# Patient Record
Sex: MALE | Race: WHITE | URBAN - METROPOLITAN AREA
[De-identification: names, ages, dates, MRNs, and addresses within clinical notes are randomized per-mention and may not be internally consistent; named-entity substitution may affect disease eponyms.]

---

## 2017-04-28 ENCOUNTER — OFFICE VISIT (OUTPATIENT)
Dept: FAMILY MEDICINE CLINIC | Age: 37
End: 2017-04-28

## 2017-04-28 VITALS
BODY MASS INDEX: 29.53 KG/M2 | SYSTOLIC BLOOD PRESSURE: 120 MMHG | DIASTOLIC BLOOD PRESSURE: 80 MMHG | HEIGHT: 72 IN | WEIGHT: 218 LBS | HEART RATE: 72 BPM

## 2017-04-28 DIAGNOSIS — S60.10XA SUBUNGUAL HEMATOMA OF DIGIT OF HAND, INITIAL ENCOUNTER: Primary | ICD-10-CM

## 2017-04-28 PROCEDURE — 99213 OFFICE O/P EST LOW 20 MIN: CPT | Performed by: FAMILY MEDICINE

## 2017-04-28 PROCEDURE — 90715 TDAP VACCINE 7 YRS/> IM: CPT | Performed by: FAMILY MEDICINE

## 2017-04-28 PROCEDURE — 90471 IMMUNIZATION ADMIN: CPT | Performed by: FAMILY MEDICINE

## 2017-04-28 RX ORDER — AZITHROMYCIN 250 MG/1
TABLET, FILM COATED ORAL
Qty: 6 TABLET | Refills: 0 | Status: SHIPPED | OUTPATIENT
Start: 2017-04-28 | End: 2019-12-12 | Stop reason: ALTCHOICE

## 2019-12-12 ENCOUNTER — OFFICE VISIT (OUTPATIENT)
Dept: FAMILY MEDICINE CLINIC | Age: 39
End: 2019-12-12
Payer: COMMERCIAL

## 2019-12-12 ENCOUNTER — HOSPITAL ENCOUNTER (OUTPATIENT)
Age: 39
Discharge: HOME | End: 2019-12-12
Payer: COMMERCIAL

## 2019-12-12 VITALS
SYSTOLIC BLOOD PRESSURE: 116 MMHG | HEART RATE: 79 BPM | OXYGEN SATURATION: 98 % | HEIGHT: 72 IN | WEIGHT: 224.2 LBS | BODY MASS INDEX: 30.37 KG/M2 | DIASTOLIC BLOOD PRESSURE: 68 MMHG

## 2019-12-12 DIAGNOSIS — M79.675 GREAT TOE PAIN, LEFT: ICD-10-CM

## 2019-12-12 DIAGNOSIS — M79.675 GREAT TOE PAIN, LEFT: Primary | ICD-10-CM

## 2019-12-12 DIAGNOSIS — K52.9 CHRONIC DIARRHEA: ICD-10-CM

## 2019-12-12 LAB
A/G RATIO: 1.6 (ref 1.1–2.2)
ALBUMIN SERPL-MCNC: 4.4 G/DL (ref 3.4–5)
ALP BLD-CCNC: 56 U/L (ref 40–129)
ALT SERPL-CCNC: 40 U/L (ref 10–40)
AMYLASE: 63 U/L (ref 25–115)
ANION GAP SERPL CALCULATED.3IONS-SCNC: 13 MMOL/L (ref 3–16)
AST SERPL-CCNC: 24 U/L (ref 15–37)
BASOPHILS ABSOLUTE: 0 K/UL (ref 0–0.2)
BASOPHILS RELATIVE PERCENT: 0.5 %
BILIRUB SERPL-MCNC: 0.4 MG/DL (ref 0–1)
BUN BLDV-MCNC: 15 MG/DL (ref 7–20)
CALCIUM SERPL-MCNC: 9.6 MG/DL (ref 8.3–10.6)
CHLORIDE BLD-SCNC: 102 MMOL/L (ref 99–110)
CO2: 26 MMOL/L (ref 21–32)
CREAT SERPL-MCNC: 1 MG/DL (ref 0.9–1.3)
EOSINOPHILS ABSOLUTE: 0.1 K/UL (ref 0–0.6)
EOSINOPHILS RELATIVE PERCENT: 2 %
GFR AFRICAN AMERICAN: >60
GFR NON-AFRICAN AMERICAN: >60
GLOBULIN: 2.8 G/DL
GLUCOSE BLD-MCNC: 102 MG/DL (ref 70–99)
HCT VFR BLD CALC: 38.7 % (ref 40.5–52.5)
HEMOGLOBIN: 13.1 G/DL (ref 13.5–17.5)
LIPASE: 35 U/L (ref 13–60)
LYMPHOCYTES ABSOLUTE: 2.1 K/UL (ref 1–5.1)
LYMPHOCYTES RELATIVE PERCENT: 31.4 %
MCH RBC QN AUTO: 27.1 PG (ref 26–34)
MCHC RBC AUTO-ENTMCNC: 33.9 G/DL (ref 31–36)
MCV RBC AUTO: 80.1 FL (ref 80–100)
MONOCYTES ABSOLUTE: 0.5 K/UL (ref 0–1.3)
MONOCYTES RELATIVE PERCENT: 8 %
NEUTROPHILS ABSOLUTE: 3.8 K/UL (ref 1.7–7.7)
NEUTROPHILS RELATIVE PERCENT: 58.1 %
PDW BLD-RTO: 14.2 % (ref 12.4–15.4)
PLATELET # BLD: 221 K/UL (ref 135–450)
PMV BLD AUTO: 9.6 FL (ref 5–10.5)
POTASSIUM SERPL-SCNC: 4.7 MMOL/L (ref 3.5–5.1)
RBC # BLD: 4.83 M/UL (ref 4.2–5.9)
SEDIMENTATION RATE, ERYTHROCYTE: 12 MM/HR (ref 0–15)
SODIUM BLD-SCNC: 141 MMOL/L (ref 136–145)
TOTAL PROTEIN: 7.2 G/DL (ref 6.4–8.2)
TSH REFLEX: 2.02 UIU/ML (ref 0.27–4.2)
URIC ACID, SERUM: 7.3 MG/DL (ref 3.5–7.2)
WBC # BLD: 6.5 K/UL (ref 4–11)

## 2019-12-12 PROCEDURE — 85652 RBC SED RATE AUTOMATED: CPT

## 2019-12-12 PROCEDURE — 84443 ASSAY THYROID STIM HORMONE: CPT

## 2019-12-12 PROCEDURE — 99213 OFFICE O/P EST LOW 20 MIN: CPT | Performed by: NURSE PRACTITIONER

## 2019-12-12 PROCEDURE — 84550 ASSAY OF BLOOD/URIC ACID: CPT

## 2019-12-12 PROCEDURE — 82150 ASSAY OF AMYLASE: CPT

## 2019-12-12 PROCEDURE — 80053 COMPREHEN METABOLIC PANEL: CPT

## 2019-12-12 PROCEDURE — 83690 ASSAY OF LIPASE: CPT

## 2019-12-12 PROCEDURE — 36415 COLL VENOUS BLD VENIPUNCTURE: CPT

## 2019-12-12 PROCEDURE — 85025 COMPLETE CBC W/AUTO DIFF WBC: CPT

## 2019-12-12 ASSESSMENT — ENCOUNTER SYMPTOMS
DIARRHEA: 1
BLOOD IN STOOL: 0
SHORTNESS OF BREATH: 0
ABDOMINAL DISTENTION: 0
COLOR CHANGE: 0
NAUSEA: 0
CONSTIPATION: 0
ABDOMINAL PAIN: 0
VOMITING: 0
RECTAL PAIN: 1

## 2019-12-12 ASSESSMENT — PATIENT HEALTH QUESTIONNAIRE - PHQ9
SUM OF ALL RESPONSES TO PHQ QUESTIONS 1-9: 0
SUM OF ALL RESPONSES TO PHQ QUESTIONS 1-9: 0
2. FEELING DOWN, DEPRESSED OR HOPELESS: 0
1. LITTLE INTEREST OR PLEASURE IN DOING THINGS: 0
SUM OF ALL RESPONSES TO PHQ9 QUESTIONS 1 & 2: 0

## 2021-02-19 ENCOUNTER — TELEPHONE (OUTPATIENT)
Dept: INTERNAL MEDICINE CLINIC | Age: 41
End: 2021-02-19

## 2021-02-19 NOTE — TELEPHONE ENCOUNTER
Patient called stating he was referred to Dr. Ilya Luong by Dr. Sal Gallardo, and wants to know if you would take him on as a new patient?      Please call to advise

## 2021-03-16 ENCOUNTER — OFFICE VISIT (OUTPATIENT)
Dept: INTERNAL MEDICINE CLINIC | Age: 41
End: 2021-03-16
Payer: COMMERCIAL

## 2021-03-16 VITALS
DIASTOLIC BLOOD PRESSURE: 74 MMHG | TEMPERATURE: 97.3 F | HEIGHT: 72 IN | WEIGHT: 231 LBS | HEART RATE: 74 BPM | SYSTOLIC BLOOD PRESSURE: 123 MMHG | OXYGEN SATURATION: 99 % | BODY MASS INDEX: 31.29 KG/M2

## 2021-03-16 DIAGNOSIS — K60.2 RECTAL FISSURE: ICD-10-CM

## 2021-03-16 DIAGNOSIS — M79.672 BILATERAL FOOT PAIN: ICD-10-CM

## 2021-03-16 DIAGNOSIS — K90.49 MALABSORPTION DUE TO INTOLERANCE, NOT ELSEWHERE CLASSIFIED: ICD-10-CM

## 2021-03-16 DIAGNOSIS — L57.0 KERATOSIS: ICD-10-CM

## 2021-03-16 DIAGNOSIS — M79.671 BILATERAL FOOT PAIN: ICD-10-CM

## 2021-03-16 DIAGNOSIS — K90.49 MALABSORPTION DUE TO INTOLERANCE, NOT ELSEWHERE CLASSIFIED: Primary | ICD-10-CM

## 2021-03-16 PROBLEM — K64.9 ACUTE HEMORRHOID: Status: ACTIVE | Noted: 2021-03-16

## 2021-03-16 LAB
A/G RATIO: 1.8 (ref 1.1–2.2)
ALBUMIN SERPL-MCNC: 4.8 G/DL (ref 3.4–5)
ALP BLD-CCNC: 56 U/L (ref 40–129)
ALT SERPL-CCNC: 28 U/L (ref 10–40)
ANION GAP SERPL CALCULATED.3IONS-SCNC: 11 MMOL/L (ref 3–16)
ANISOCYTOSIS: ABNORMAL
AST SERPL-CCNC: 18 U/L (ref 15–37)
BASOPHILS ABSOLUTE: 0 K/UL (ref 0–0.2)
BASOPHILS RELATIVE PERCENT: 0.8 %
BILIRUB SERPL-MCNC: 0.3 MG/DL (ref 0–1)
BUN BLDV-MCNC: 13 MG/DL (ref 7–20)
CALCIUM SERPL-MCNC: 9.5 MG/DL (ref 8.3–10.6)
CHLORIDE BLD-SCNC: 100 MMOL/L (ref 99–110)
CO2: 27 MMOL/L (ref 21–32)
CREAT SERPL-MCNC: 0.9 MG/DL (ref 0.9–1.3)
EOSINOPHILS ABSOLUTE: 0.1 K/UL (ref 0–0.6)
EOSINOPHILS RELATIVE PERCENT: 1.7 %
GFR AFRICAN AMERICAN: >60
GFR NON-AFRICAN AMERICAN: >60
GLOBULIN: 2.6 G/DL
GLUCOSE BLD-MCNC: 127 MG/DL (ref 70–99)
HCT VFR BLD CALC: 30.1 % (ref 40.5–52.5)
HEMATOLOGY PATH CONSULT: YES
HEMOGLOBIN: 9 G/DL (ref 13.5–17.5)
HYPOCHROMIA: ABNORMAL
IGA: 296 MG/DL (ref 70–400)
LYMPHOCYTES ABSOLUTE: 2 K/UL (ref 1–5.1)
LYMPHOCYTES RELATIVE PERCENT: 35 %
MCH RBC QN AUTO: 17.5 PG (ref 26–34)
MCHC RBC AUTO-ENTMCNC: 29.9 G/DL (ref 31–36)
MCV RBC AUTO: 58.4 FL (ref 80–100)
MICROCYTES: ABNORMAL
MONOCYTES ABSOLUTE: 0.5 K/UL (ref 0–1.3)
MONOCYTES RELATIVE PERCENT: 9.1 %
NEUTROPHILS ABSOLUTE: 3 K/UL (ref 1.7–7.7)
NEUTROPHILS RELATIVE PERCENT: 53.4 %
OVALOCYTES: ABNORMAL
PDW BLD-RTO: 18.4 % (ref 12.4–15.4)
PLATELET # BLD: 331 K/UL (ref 135–450)
PMV BLD AUTO: 9 FL (ref 5–10.5)
POTASSIUM SERPL-SCNC: 4.5 MMOL/L (ref 3.5–5.1)
RBC # BLD: 5.15 M/UL (ref 4.2–5.9)
SCHISTOCYTES: ABNORMAL
SODIUM BLD-SCNC: 138 MMOL/L (ref 136–145)
TARGET CELLS: ABNORMAL
TOTAL PROTEIN: 7.4 G/DL (ref 6.4–8.2)
WBC # BLD: 5.6 K/UL (ref 4–11)

## 2021-03-16 PROCEDURE — 99214 OFFICE O/P EST MOD 30 MIN: CPT | Performed by: INTERNAL MEDICINE

## 2021-03-16 RX ORDER — HYDROCORTISONE 25 MG/G
CREAM TOPICAL
Qty: 28 G | Refills: 2 | Status: SHIPPED | OUTPATIENT
Start: 2021-03-16

## 2021-03-16 ASSESSMENT — PATIENT HEALTH QUESTIONNAIRE - PHQ9: SUM OF ALL RESPONSES TO PHQ9 QUESTIONS 1 & 2: 0

## 2021-03-16 NOTE — PROGRESS NOTES
CHIEF COMPLAINT: Brianne Kidd is a 38 y.o. male who presents for : Malabsorption and  Rectal pain  HPI: Patient presented with malabsorption and that every time he eats he has to go to the bathroom this is been going on for a number of years he has had a colonoscopy was negative but never was worked worked up for malabsorption he is unsure what medicines or foods induce this his weight has been stable and there is no family history of any malabsorption including celiac disease in addition he complains of about a 6-month history of severe rectal pain is been diagnosed as hemorrhoids is tried various hemorrhoid medicine with little success    Review of Systems:   Constitutional:  Denies fever or chills   Eyes:  Denies change in visual acuity   HENT:  Denies nasal congestion or sore throat   Respiratory:  Denies cough or shortness of breath   Cardiovascular:  Denies chest pain or edema   GI:  Denies abdominal pain, nausea, vomiting, bloody stools or diarrhea   :  Denies dysuria   Musculoskeletal:  Denies back pain or joint pain   Integument:  Denies rash   Neurologic:  Denies headache, focal weakness or sensory changes   Endocrine:  Denies polyuria or polydipsia   Lymphatic:  Denies swollen glands   Psychiatric:  Denies depression or anxiety     Past Medical History:        Diagnosis Date    Acute hemorrhoid 3/16/2021    Malabsorption due to intolerance, not elsewhere classified 3/16/2021    Rectal fissure 3/16/2021       Past Surgical History:        Procedure Laterality Date    VASECTOMY      WISDOM TOOTH EXTRACTION         Family History:  Family History   Problem Relation Age of Onset    Cancer Maternal Grandmother         melanoma    Cancer Maternal Grandfather     Cancer Paternal Grandmother         pancreatic       Social History:  Social History     Socioeconomic History    Marital status:      Spouse name: None    Number of children: None    Years of education: None    Highest education level: None   Occupational History    None   Social Needs    Financial resource strain: None    Food insecurity     Worry: None     Inability: None    Transportation needs     Medical: None     Non-medical: None   Tobacco Use    Smoking status: Former Smoker     Quit date: 10/2/2011     Years since quittin.4    Smokeless tobacco: Never Used   Substance and Sexual Activity    Alcohol use: Yes     Comment: SOCIAL    Drug use: No    Sexual activity: None   Lifestyle    Physical activity     Days per week: None     Minutes per session: None    Stress: None   Relationships    Social connections     Talks on phone: None     Gets together: None     Attends Religion service: None     Active member of club or organization: None     Attends meetings of clubs or organizations: None     Relationship status: None    Intimate partner violence     Fear of current or ex partner: None     Emotionally abused: None     Physically abused: None     Forced sexual activity: None   Other Topics Concern    None   Social History Narrative    None         Allergies:  Cephalosporins and Penicillins    Current Medications:    Prior to Admission medications    Medication Sig Start Date End Date Taking? Authorizing Provider   BACILLUS COAGULANS-INULIN PO Take by mouth   Yes Historical Provider, MD   hydrocortisone (ANUSOL-HC) 2.5 % CREA rectal cream Use bid prn 3/16/21  Yes Jaimee Ramos MD       Physical Exam:  Vital Signs: /74   Pulse 74   Temp 97.3 °F (36.3 °C)   Ht 6' (1.829 m)   Wt 231 lb (104.8 kg)   SpO2 99% Comment: room air  BMI 31.33 kg/m²   General: Patient appears  non-toxic  HENT: Atraumatic, normocephalic, oral mucosa moist  Lungs:  Clear bilaterally  Heart: Regular rate and rhythm  Abdomen: Non-distended, soft, non-tender  Extremities: No edema  Neuro: Nonfocal  Rectal exam reveals a fissure at 6:00 heme positive  Medical Decision Making and Plan:  Pertinent Labs & Imaging studies reviewed.  (See chart for details)  Studies and stool studies are pending    1. Acute hemorrhoid  No evidence of hemorrhoids on current exam more consistent with rectal fissure    2. Malabsorption due to intolerance, not elsewhere classified  Check above labs will send to dietitian  - hydrocortisone (ANUSOL-HC) 2.5 % CREA rectal cream; Use bid prn  Dispense: 28 g; Refill: 2  - CBC Auto Differential; Future  - SEDIMENTATION RATE; Future  - COMPREHENSIVE METABOLIC PANEL; Future  - Celiac Screen with Reflex; Future  - CALPROTECTIN STOOL; Future  - PANCREATIC ELASTASE, FECAL; Future  - Internal Referral to Dietitian    3. Rectal fissure  Trial of sitz bath and Anusol HC if this does not help we will consider nitro ointment  - hydrocortisone (ANUSOL-HC) 2.5 % CREA rectal cream; Use bid prn  Dispense: 28 g; Refill: 2  - CBC Auto Differential; Future  - SEDIMENTATION RATE; Future  - COMPREHENSIVE METABOLIC PANEL; Future  - Celiac Screen with Reflex; Future  - CALPROTECTIN STOOL; Future  - PANCREATIC ELASTASE, FECAL; Future    4. Keratosis  Referral to Dr. Sondra Hunter due for removal of keratosis of the left ear  - [place] - Blair Krause MD, Plastic Surgery, [facility]    5.  Bilateral foot pain  Referral to podiatry  - Amb External Referral To Podiatry

## 2021-03-17 DIAGNOSIS — K60.2 RECTAL FISSURE: ICD-10-CM

## 2021-03-17 DIAGNOSIS — K90.49 MALABSORPTION DUE TO INTOLERANCE, NOT ELSEWHERE CLASSIFIED: ICD-10-CM

## 2021-03-17 LAB
FOLATE: 9.82 NG/ML (ref 4.78–24.2)
HEMATOLOGY PATH CONSULT: NORMAL
IRON SATURATION: 3 % (ref 20–50)
IRON: 15 UG/DL (ref 59–158)
SEDIMENTATION RATE, ERYTHROCYTE: 11 MM/HR (ref 0–15)
TISSUE TRANSGLUTAMINASE IGA: <0.5 U/ML (ref 0–14)
TOTAL IRON BINDING CAPACITY: 524 UG/DL (ref 260–445)
VITAMIN B-12: 474 PG/ML (ref 211–911)

## 2021-03-18 DIAGNOSIS — E61.1 IRON DEFICIENCY: Primary | ICD-10-CM

## 2021-03-19 LAB — CALPROTECTIN, FECAL: <5 UG/G

## 2021-03-20 LAB — PANCREATIC ELASTASE, FECAL: 618 UG/G

## 2023-12-13 PROBLEM — B02.9 ZOSTER: Status: ACTIVE | Noted: 2023-12-13

## 2024-01-08 ENCOUNTER — OFFICE VISIT (OUTPATIENT)
Dept: INTERNAL MEDICINE CLINIC | Age: 44
End: 2024-01-08
Payer: COMMERCIAL

## 2024-01-08 VITALS
SYSTOLIC BLOOD PRESSURE: 118 MMHG | HEIGHT: 72 IN | WEIGHT: 234.6 LBS | OXYGEN SATURATION: 98 % | DIASTOLIC BLOOD PRESSURE: 88 MMHG | BODY MASS INDEX: 31.77 KG/M2 | HEART RATE: 60 BPM

## 2024-01-08 DIAGNOSIS — M10.9 GOUT, UNSPECIFIED CAUSE, UNSPECIFIED CHRONICITY, UNSPECIFIED SITE: ICD-10-CM

## 2024-01-08 DIAGNOSIS — K90.49 MALABSORPTION DUE TO INTOLERANCE, NOT ELSEWHERE CLASSIFIED: ICD-10-CM

## 2024-01-08 DIAGNOSIS — E66.09 CLASS 1 OBESITY DUE TO EXCESS CALORIES WITHOUT SERIOUS COMORBIDITY WITH BODY MASS INDEX (BMI) OF 31.0 TO 31.9 IN ADULT: ICD-10-CM

## 2024-01-08 DIAGNOSIS — Z00.00 PREVENTATIVE HEALTH CARE: Primary | ICD-10-CM

## 2024-01-08 DIAGNOSIS — Z13.220 SCREENING FOR CHOLESTEROL LEVEL: ICD-10-CM

## 2024-01-08 DIAGNOSIS — D50.9 IRON DEFICIENCY ANEMIA, UNSPECIFIED IRON DEFICIENCY ANEMIA TYPE: ICD-10-CM

## 2024-01-08 DIAGNOSIS — K64.2 GRADE III HEMORRHOIDS: ICD-10-CM

## 2024-01-08 PROBLEM — K58.8 OTHER IRRITABLE BOWEL SYNDROME: Status: ACTIVE | Noted: 2018-12-04

## 2024-01-08 PROCEDURE — 99214 OFFICE O/P EST MOD 30 MIN: CPT | Performed by: INTERNAL MEDICINE

## 2024-01-08 PROCEDURE — 99396 PREV VISIT EST AGE 40-64: CPT | Performed by: INTERNAL MEDICINE

## 2024-01-08 RX ORDER — CHOLESTYRAMINE 4 G/9G
POWDER, FOR SUSPENSION ORAL
Qty: 90 EACH | OUTPATIENT
Start: 2024-01-08

## 2024-01-08 RX ORDER — CHOLESTYRAMINE 4 G/9G
1 POWDER, FOR SUSPENSION ORAL DAILY
Qty: 90 PACKET | Refills: 3 | Status: SHIPPED | OUTPATIENT
Start: 2024-01-08

## 2024-01-08 SDOH — ECONOMIC STABILITY: INCOME INSECURITY: HOW HARD IS IT FOR YOU TO PAY FOR THE VERY BASICS LIKE FOOD, HOUSING, MEDICAL CARE, AND HEATING?: NOT HARD AT ALL

## 2024-01-08 SDOH — ECONOMIC STABILITY: FOOD INSECURITY: WITHIN THE PAST 12 MONTHS, THE FOOD YOU BOUGHT JUST DIDN'T LAST AND YOU DIDN'T HAVE MONEY TO GET MORE.: NEVER TRUE

## 2024-01-08 SDOH — ECONOMIC STABILITY: HOUSING INSECURITY
IN THE LAST 12 MONTHS, WAS THERE A TIME WHEN YOU DID NOT HAVE A STEADY PLACE TO SLEEP OR SLEPT IN A SHELTER (INCLUDING NOW)?: NO

## 2024-01-08 SDOH — ECONOMIC STABILITY: FOOD INSECURITY: WITHIN THE PAST 12 MONTHS, YOU WORRIED THAT YOUR FOOD WOULD RUN OUT BEFORE YOU GOT MONEY TO BUY MORE.: NEVER TRUE

## 2024-01-08 ASSESSMENT — PATIENT HEALTH QUESTIONNAIRE - PHQ9
2. FEELING DOWN, DEPRESSED OR HOPELESS: 0
SUM OF ALL RESPONSES TO PHQ9 QUESTIONS 1 & 2: 0
SUM OF ALL RESPONSES TO PHQ QUESTIONS 1-9: 0
1. LITTLE INTEREST OR PLEASURE IN DOING THINGS: 0
SUM OF ALL RESPONSES TO PHQ QUESTIONS 1-9: 0

## 2024-01-08 ASSESSMENT — ENCOUNTER SYMPTOMS
BLOOD IN STOOL: 0
SINUS PAIN: 0
CHEST TIGHTNESS: 0
CONSTIPATION: 0
SHORTNESS OF BREATH: 0
COLOR CHANGE: 0
WHEEZING: 0
ABDOMINAL PAIN: 0
COUGH: 0
DIARRHEA: 1

## 2024-01-08 NOTE — PROGRESS NOTES
Herman Rowe (:  1980) is a 43 y.o. male,Established patient, here for evaluation of the following chief complaint(s):  Established New Doctor         ASSESSMENT/PLAN:  1. Preventative health care  -     CBC with Auto Differential; Future  -     Comprehensive Metabolic Panel; Future  -     Hemoglobin A1C; Future  2. Grade III hemorrhoids  3. Screening for cholesterol level  -     Lipid Panel; Future  4. Malabsorption due to intolerance, not elsewhere classified  -     cholestyramine (QUESTRAN) 4 g packet; Take 1 packet by mouth daily, Disp-90 packet, R-3Normal  5. Iron deficiency anemia, unspecified iron deficiency anemia type  -     CBC with Auto Differential; Future  -     Reticulocytes; Future  -     Iron and TIBC; Future  -     Ferritin; Future  -     Vitamin B12 & Folate; Future  6. Class 1 obesity due to excess calories without serious comorbidity with body mass index (BMI) of 31.0 to 31.9 in adult  7. Gout, unspecified cause, unspecified chronicity, unspecified site  -     Uric Acid; Future  Reviewed patient's current medical issues from preventive perspective the patient did have a colonoscopy last year as part of his workup for diarrhea and found 2 polyps he is to follow-up with his gastroenterologist if he has further bleeding from his hemorrhoids but at this point he is treating it symptomatically and he reports that his hemorrhoids do improve when he use the Questran.    Patient was diagnosed with bile induced diarrhea Questran was utilized with significant improvement the past week and refill that for the patient continue the same and monitor.    Patient did have history of a gout attack in the past we will check his uric acid and monitor clinically.    We discussed with patient his stage I obesity and the need to monitor that and the risks associated with it.        No follow-ups on file.         Subjective   SUBJECTIVE/OBJECTIVE:    Lab Review   Lab Results   Component Value Date/Time

## 2024-01-08 NOTE — TELEPHONE ENCOUNTER
Last OV: 1/8/2024  Next OV: Visit date not found    Next appointment due:around 1/8/2025     Last fill:1/8/24  Refills:3

## 2024-01-09 DIAGNOSIS — D50.9 IRON DEFICIENCY ANEMIA, UNSPECIFIED IRON DEFICIENCY ANEMIA TYPE: ICD-10-CM

## 2024-01-09 DIAGNOSIS — Z13.220 SCREENING FOR CHOLESTEROL LEVEL: ICD-10-CM

## 2024-01-09 DIAGNOSIS — M10.9 GOUT, UNSPECIFIED CAUSE, UNSPECIFIED CHRONICITY, UNSPECIFIED SITE: ICD-10-CM

## 2024-01-09 DIAGNOSIS — Z00.00 PREVENTATIVE HEALTH CARE: ICD-10-CM

## 2024-01-09 LAB
ALBUMIN SERPL-MCNC: 4.8 G/DL (ref 3.4–5)
ALBUMIN/GLOB SERPL: 1.9 {RATIO} (ref 1.1–2.2)
ALP SERPL-CCNC: 65 U/L (ref 40–129)
ALT SERPL-CCNC: 65 U/L (ref 10–40)
ANION GAP SERPL CALCULATED.3IONS-SCNC: 15 MMOL/L (ref 3–16)
AST SERPL-CCNC: 31 U/L (ref 15–37)
BASOPHILS # BLD: 0 K/UL (ref 0–0.2)
BASOPHILS NFR BLD: 0.4 %
BILIRUB SERPL-MCNC: 0.6 MG/DL (ref 0–1)
BUN SERPL-MCNC: 14 MG/DL (ref 7–20)
CALCIUM SERPL-MCNC: 9.6 MG/DL (ref 8.3–10.6)
CHLORIDE SERPL-SCNC: 100 MMOL/L (ref 99–110)
CHOLEST SERPL-MCNC: 222 MG/DL (ref 0–199)
CO2 SERPL-SCNC: 23 MMOL/L (ref 21–32)
CREAT SERPL-MCNC: 0.9 MG/DL (ref 0.9–1.3)
DEPRECATED RDW RBC AUTO: 14.4 % (ref 12.4–15.4)
EOSINOPHIL # BLD: 0.2 K/UL (ref 0–0.6)
EOSINOPHIL NFR BLD: 2.8 %
FERRITIN SERPL IA-MCNC: 49.5 NG/ML (ref 30–400)
FOLATE SERPL-MCNC: 15.68 NG/ML (ref 4.78–24.2)
GFR SERPLBLD CREATININE-BSD FMLA CKD-EPI: >60 ML/MIN/{1.73_M2}
GLUCOSE SERPL-MCNC: 102 MG/DL (ref 70–99)
HCT VFR BLD AUTO: 43.2 % (ref 40.5–52.5)
HDLC SERPL-MCNC: 56 MG/DL (ref 40–60)
HGB BLD-MCNC: 14.6 G/DL (ref 13.5–17.5)
IMMATURE RETIC FRACT: 0.42 (ref 0.21–0.37)
IRON SATN MFR SERPL: 16 % (ref 20–50)
IRON SERPL-MCNC: 73 UG/DL (ref 59–158)
LDLC SERPL CALC-MCNC: 137 MG/DL
LYMPHOCYTES # BLD: 2 K/UL (ref 1–5.1)
LYMPHOCYTES NFR BLD: 33.2 %
MCH RBC QN AUTO: 27.7 PG (ref 26–34)
MCHC RBC AUTO-ENTMCNC: 33.7 G/DL (ref 31–36)
MCV RBC AUTO: 82.1 FL (ref 80–100)
MONOCYTES # BLD: 0.5 K/UL (ref 0–1.3)
MONOCYTES NFR BLD: 8 %
NEUTROPHILS NFR BLD: 55.6 %
PLATELET # BLD AUTO: 190 K/UL (ref 135–450)
PMV BLD AUTO: 9.8 FL (ref 5–10.5)
POTASSIUM SERPL-SCNC: 4.9 MMOL/L (ref 3.5–5.1)
PROT SERPL-MCNC: 7.3 G/DL (ref 6.4–8.2)
RBC # BLD AUTO: 5.26 M/UL (ref 4.2–5.9)
RETICS # AUTO: 0.07 M/UL
RETICS/RBC NFR AUTO: 1.32 % (ref 0.5–2.18)
SODIUM SERPL-SCNC: 138 MMOL/L (ref 136–145)
TIBC SERPL-MCNC: 454 UG/DL (ref 260–445)
TRIGL SERPL-MCNC: 146 MG/DL (ref 0–150)
URATE SERPL-MCNC: 8.5 MG/DL (ref 3.5–7.2)
VIT B12 SERPL-MCNC: 554 PG/ML (ref 211–911)
VLDLC SERPL CALC-MCNC: 29 MG/DL
WBC # BLD AUTO: 6 K/UL (ref 4–11)

## 2024-01-10 LAB
EST. AVERAGE GLUCOSE BLD GHB EST-MCNC: 116.9 MG/DL
HBA1C MFR BLD: 5.7 %

## 2024-01-19 NOTE — RESULT ENCOUNTER NOTE
Please advise patient that the results were acceptable with the exception of the cholesterol and sugar being elevated, the patient is advised to work on aggressive lifestyle modification including different food choices as well as lowering the fat content of the diet in addition to daily exercises with a minimum of 150 minutes/week in 10-minute increments of mild to moderate exercise like brisk walking    Is also noted that the uric acid is elevated and we may need to treat that if he has repeated gouty attacks    Also noted the patient is in the prediabetic range and he needs to work on lifestyle changes including diet and exercise and will reassess in 6 months please advise patient to make an appointment    The 10-year ASCVD risk score (Jm NORIEGA, et al., 2019) is: 1.6%    Values used to calculate the score:      Age: 43 years      Sex: Male      Is Non- : No      Diabetic: No      Tobacco smoker: No      Systolic Blood Pressure: 118 mmHg      Is BP treated: No      HDL Cholesterol: 56 mg/dL      Total Cholesterol: 222 mg/dL

## 2024-05-18 DIAGNOSIS — K90.49 MALABSORPTION DUE TO INTOLERANCE, NOT ELSEWHERE CLASSIFIED: ICD-10-CM

## 2024-05-20 RX ORDER — CHOLESTYRAMINE 4 G/9G
POWDER, FOR SUSPENSION ORAL
Qty: 30 EACH | OUTPATIENT
Start: 2024-05-20

## 2024-05-20 NOTE — TELEPHONE ENCOUNTER
Last OV: 1/8/2024  Next OV: 7/17/2024    Next appointment due:around 1/8/2025     Last fill:1/8/24  Refills:3 #90

## 2024-07-17 ENCOUNTER — OFFICE VISIT (OUTPATIENT)
Dept: INTERNAL MEDICINE CLINIC | Age: 44
End: 2024-07-17
Payer: COMMERCIAL

## 2024-07-17 VITALS
WEIGHT: 218.2 LBS | BODY MASS INDEX: 29.55 KG/M2 | DIASTOLIC BLOOD PRESSURE: 70 MMHG | HEART RATE: 53 BPM | SYSTOLIC BLOOD PRESSURE: 102 MMHG | OXYGEN SATURATION: 98 % | HEIGHT: 72 IN

## 2024-07-17 DIAGNOSIS — E66.3 OVERWEIGHT (BMI 25.0-29.9): ICD-10-CM

## 2024-07-17 DIAGNOSIS — K90.49 MALABSORPTION DUE TO INTOLERANCE, NOT ELSEWHERE CLASSIFIED: ICD-10-CM

## 2024-07-17 DIAGNOSIS — E79.0 HYPERURICEMIA: ICD-10-CM

## 2024-07-17 DIAGNOSIS — E78.2 MIXED HYPERLIPIDEMIA: Primary | ICD-10-CM

## 2024-07-17 DIAGNOSIS — R73.03 PREDIABETES: ICD-10-CM

## 2024-07-17 PROBLEM — B02.9 ZOSTER: Status: RESOLVED | Noted: 2023-12-13 | Resolved: 2024-07-17

## 2024-07-17 PROCEDURE — 99214 OFFICE O/P EST MOD 30 MIN: CPT | Performed by: INTERNAL MEDICINE

## 2024-07-17 RX ORDER — CHOLESTYRAMINE 4 G/9G
1 POWDER, FOR SUSPENSION ORAL DAILY
Qty: 90 PACKET | Refills: 3 | Status: SHIPPED | OUTPATIENT
Start: 2024-07-17

## 2024-07-17 ASSESSMENT — ENCOUNTER SYMPTOMS
WHEEZING: 0
CONSTIPATION: 0
SHORTNESS OF BREATH: 0
BLOOD IN STOOL: 0
COUGH: 0
ABDOMINAL PAIN: 0
CHEST TIGHTNESS: 0
SINUS PAIN: 0
COLOR CHANGE: 0

## 2024-07-17 NOTE — PROGRESS NOTES
Herman Rowe (:  1980) is a 43 y.o. male,Established patient, here for evaluation of the following chief complaint(s):  6 Month Follow-Up      Assessment & Plan   ASSESSMENT/PLAN:  1. Mixed hyperlipidemia  -     Lipid Panel; Future  2. Malabsorption due to intolerance, not elsewhere classified  -     cholestyramine (QUESTRAN) 4 g packet; Take 1 packet by mouth daily, Disp-90 packet, R-3Normal  3. Hyperuricemia  4. Overweight (BMI 25.0-29.9)  5. Prediabetes  -     Hemoglobin A1C; Future  Patient has lost 16 pounds since his last visit his blood pressure has improved at this point he is in the overweight range not obesity we will get a check his sugar to see if he has exited the prediabetes and we will reevaluate his cholesterol as well he does not qualify for treatment for that given that his risk for any event in the next 10 years is quite low    If the patient continues down this path with increased activity and weight loss will have him follow-up only on annual basis he is up-to-date on his preventive screening and otherwise have no complaint    Return in about 1 year (around 2025) for Physical.         Subjective   SUBJECTIVE/OBJECTIVE:    Lab Review   Lab Results   Component Value Date/Time     2024 09:08 AM     2021 10:56 AM     2019 04:25 PM    K 4.9 2024 09:08 AM    K 4.5 2021 10:56 AM    K 4.7 2019 04:25 PM    CO2 23 2024 09:08 AM    CO2 27 2021 10:56 AM    CO2 26 2019 04:25 PM    BUN 14 2024 09:08 AM    BUN 13 2021 10:56 AM    BUN 15 2019 04:25 PM    CREATININE 0.9 2024 09:08 AM    CREATININE 0.9 2021 10:56 AM    CREATININE 1.0 2019 04:25 PM    GLUCOSE 102 2024 09:08 AM    GLUCOSE 127 2021 10:56 AM    GLUCOSE 102 2019 04:25 PM    CALCIUM 9.6 2024 09:08 AM    CALCIUM 9.5 2021 10:56 AM    CALCIUM 9.6 2019 04:25 PM     Lab Results   Component Value